# Patient Record
Sex: FEMALE | Employment: UNEMPLOYED | URBAN - METROPOLITAN AREA
[De-identification: names, ages, dates, MRNs, and addresses within clinical notes are randomized per-mention and may not be internally consistent; named-entity substitution may affect disease eponyms.]

---
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
## 2020-01-18 ENCOUNTER — HOSPITAL ENCOUNTER (EMERGENCY)
Age: 20
Discharge: HOME | End: 2020-01-18
Attending: EMERGENCY MEDICINE
Payer: COMMERCIAL

## 2020-01-18 ENCOUNTER — APPOINTMENT (OUTPATIENT)
Dept: GENERAL RADIOLOGY | Age: 20
End: 2020-01-18
Payer: COMMERCIAL

## 2020-01-18 ENCOUNTER — APPOINTMENT (OUTPATIENT)
Dept: CT IMAGING | Age: 20
End: 2020-01-18
Payer: COMMERCIAL

## 2020-01-18 VITALS
DIASTOLIC BLOOD PRESSURE: 78 MMHG | TEMPERATURE: 98.9 F | OXYGEN SATURATION: 99 % | HEART RATE: 99 BPM | RESPIRATION RATE: 16 BRPM | SYSTOLIC BLOOD PRESSURE: 110 MMHG

## 2020-01-18 PROBLEM — S80.11XA: Status: ACTIVE | Noted: 2020-01-18
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
## 2020-01-18 PROBLEM — S30.1XXA ABDOMINAL CONTUSION: Status: ACTIVE | Noted: 2020-01-18

## 2020-01-18 LAB
ABO/RH: NORMAL
ACETAMINOPHEN LEVEL: <5 MCG/ML (ref 10–30)
ALBUMIN SERPL-MCNC: 4.5 G/DL (ref 3.5–5.2)
ALP BLD-CCNC: 62 U/L (ref 35–104)
ALT SERPL-CCNC: 12 U/L (ref 0–32)
ANION GAP SERPL CALCULATED.3IONS-SCNC: 12 MMOL/L (ref 7–16)
ANTIBODY SCREEN: NORMAL
APTT: 32.9 SEC (ref 24.5–35.1)
AST SERPL-CCNC: 20 U/L (ref 0–31)
BILIRUB SERPL-MCNC: 0.3 MG/DL (ref 0–1.2)
BUN BLDV-MCNC: 5 MG/DL (ref 6–20)
CALCIUM SERPL-MCNC: 9.9 MG/DL (ref 8.6–10.2)
CHLORIDE BLD-SCNC: 103 MMOL/L (ref 98–107)
CO2: 22 MMOL/L (ref 22–29)
CREAT SERPL-MCNC: 0.6 MG/DL (ref 0.5–1)
ETHANOL: <10 MG/DL (ref 0–0.08)
GFR AFRICAN AMERICAN: >60
GFR NON-AFRICAN AMERICAN: >60 ML/MIN/1.73
GLUCOSE BLD-MCNC: 109 MG/DL (ref 74–99)
HCG QUALITATIVE: NEGATIVE
HCT VFR BLD CALC: 39.1 % (ref 34–48)
HEMOGLOBIN: 12.8 G/DL (ref 11.5–15.5)
INR BLD: 1.1
LACTIC ACID: 1.3 MMOL/L (ref 0.5–2.2)
MCH RBC QN AUTO: 29 PG (ref 26–35)
MCHC RBC AUTO-ENTMCNC: 32.7 % (ref 32–34.5)
MCV RBC AUTO: 88.7 FL (ref 80–99.9)
PDW BLD-RTO: 12.8 FL (ref 11.5–15)
PLATELET # BLD: 300 E9/L (ref 130–450)
PMV BLD AUTO: 10.5 FL (ref 7–12)
POTASSIUM SERPL-SCNC: 3.5 MMOL/L (ref 3.5–5)
PROTHROMBIN TIME: 12.3 SEC (ref 9.3–12.4)
RBC # BLD: 4.41 E12/L (ref 3.5–5.5)
SALICYLATE, SERUM: <0.3 MG/DL (ref 0–30)
SODIUM BLD-SCNC: 137 MMOL/L (ref 132–146)
TOTAL PROTEIN: 8.3 G/DL (ref 6.4–8.3)
TRICYCLIC ANTIDEPRESSANTS SCREEN SERUM: NEGATIVE NG/ML
WBC # BLD: 8 E9/L (ref 4.5–11.5)

## 2020-01-18 PROCEDURE — 72170 X-RAY EXAM OF PELVIS: CPT

## 2020-01-18 PROCEDURE — 73590 X-RAY EXAM OF LOWER LEG: CPT

## 2020-01-18 PROCEDURE — 83605 ASSAY OF LACTIC ACID: CPT

## 2020-01-18 PROCEDURE — 6360000004 HC RX CONTRAST MEDICATION: Performed by: RADIOLOGY

## 2020-01-18 PROCEDURE — 86901 BLOOD TYPING SEROLOGIC RH(D): CPT

## 2020-01-18 PROCEDURE — 99285 EMERGENCY DEPT VISIT HI MDM: CPT

## 2020-01-18 PROCEDURE — 85730 THROMBOPLASTIN TIME PARTIAL: CPT

## 2020-01-18 PROCEDURE — 85610 PROTHROMBIN TIME: CPT

## 2020-01-18 PROCEDURE — 72125 CT NECK SPINE W/O DYE: CPT

## 2020-01-18 PROCEDURE — 86850 RBC ANTIBODY SCREEN: CPT

## 2020-01-18 PROCEDURE — 86900 BLOOD TYPING SEROLOGIC ABO: CPT

## 2020-01-18 PROCEDURE — 80053 COMPREHEN METABOLIC PANEL: CPT

## 2020-01-18 PROCEDURE — 99285 EMERGENCY DEPT VISIT HI MDM: CPT | Performed by: SURGERY

## 2020-01-18 PROCEDURE — 84703 CHORIONIC GONADOTROPIN ASSAY: CPT

## 2020-01-18 PROCEDURE — 73562 X-RAY EXAM OF KNEE 3: CPT

## 2020-01-18 PROCEDURE — 74177 CT ABD & PELVIS W/CONTRAST: CPT

## 2020-01-18 PROCEDURE — 80307 DRUG TEST PRSMV CHEM ANLYZR: CPT

## 2020-01-18 PROCEDURE — 6810039000 HC L1 TRAUMA ALERT

## 2020-01-18 PROCEDURE — 70450 CT HEAD/BRAIN W/O DYE: CPT

## 2020-01-18 PROCEDURE — 85027 COMPLETE CBC AUTOMATED: CPT

## 2020-01-18 PROCEDURE — 36415 COLL VENOUS BLD VENIPUNCTURE: CPT

## 2020-01-18 PROCEDURE — G0480 DRUG TEST DEF 1-7 CLASSES: HCPCS

## 2020-01-18 PROCEDURE — 71045 X-RAY EXAM CHEST 1 VIEW: CPT

## 2020-01-18 PROCEDURE — 71260 CT THORAX DX C+: CPT

## 2020-01-18 RX ORDER — SODIUM CHLORIDE 0.9 % (FLUSH) 0.9 %
10 SYRINGE (ML) INJECTION PRN
Status: DISCONTINUED | OUTPATIENT
Start: 2020-01-18 | End: 2020-01-18 | Stop reason: HOSPADM

## 2020-01-18 RX ORDER — FENTANYL CITRATE 50 UG/ML
INJECTION, SOLUTION INTRAMUSCULAR; INTRAVENOUS
Status: DISCONTINUED
Start: 2020-01-18 | End: 2020-01-18 | Stop reason: WASHOUT

## 2020-01-18 RX ADMIN — IOPAMIDOL 110 ML: 755 INJECTION, SOLUTION INTRAVENOUS at 12:38
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
## 2020-01-18 NOTE — ED NOTES
Abrasion to right medial shin. Right lower quadrant abdominal pain. Periumbilical pain noted.      Vazquez Zhang RN  01/18/20 [id number]

## 2023-07-31 ENCOUNTER — HOSPITAL ENCOUNTER (EMERGENCY)
Age: 23
Discharge: ANOTHER HOSPITAL | End: 2023-08-01
Attending: EMERGENCY MEDICINE
Payer: COMMERCIAL

## 2023-07-31 DIAGNOSIS — I10 ESSENTIAL HYPERTENSION: ICD-10-CM

## 2023-07-31 DIAGNOSIS — Z34.93 PREGNANT AND NOT YET DELIVERED IN THIRD TRIMESTER: ICD-10-CM

## 2023-07-31 DIAGNOSIS — R47.9 DIFFICULTY WITH SPEECH: Primary | ICD-10-CM

## 2023-07-31 DIAGNOSIS — R20.2 PARESTHESIAS: ICD-10-CM

## 2023-07-31 LAB
ALBUMIN SERPL-MCNC: 3.6 G/DL (ref 3.4–5)
ALBUMIN/GLOB SERPL: 1.2 {RATIO} (ref 1.1–2.2)
ALP SERPL-CCNC: 1060 U/L (ref 40–129)
ALT SERPL-CCNC: 10 U/L (ref 10–40)
ANION GAP SERPL CALCULATED.3IONS-SCNC: 11 MMOL/L (ref 3–16)
AST SERPL-CCNC: 15 U/L (ref 15–37)
BACTERIA URNS QL MICRO: ABNORMAL /HPF
BASOPHILS # BLD: 0.1 K/UL (ref 0–0.2)
BASOPHILS NFR BLD: 0.8 %
BILIRUB SERPL-MCNC: <0.2 MG/DL (ref 0–1)
BILIRUB UR QL STRIP.AUTO: NEGATIVE
BUN SERPL-MCNC: 5 MG/DL (ref 7–20)
CALCIUM SERPL-MCNC: 9.7 MG/DL (ref 8.3–10.6)
CHLORIDE SERPL-SCNC: 106 MMOL/L (ref 99–110)
CLARITY UR: CLEAR
CO2 SERPL-SCNC: 21 MMOL/L (ref 21–32)
COLOR UR: YELLOW
CREAT SERPL-MCNC: <0.5 MG/DL (ref 0.6–1.1)
DEPRECATED RDW RBC AUTO: 14.4 % (ref 12.4–15.4)
EOSINOPHIL # BLD: 0.1 K/UL (ref 0–0.6)
EOSINOPHIL NFR BLD: 0.9 %
EPI CELLS #/AREA URNS AUTO: 7 /HPF (ref 0–5)
GFR SERPLBLD CREATININE-BSD FMLA CKD-EPI: >60 ML/MIN/{1.73_M2}
GLUCOSE BLD-MCNC: 100 MG/DL (ref 70–99)
GLUCOSE SERPL-MCNC: 101 MG/DL (ref 70–99)
GLUCOSE UR STRIP.AUTO-MCNC: NEGATIVE MG/DL
HCT VFR BLD AUTO: 32.2 % (ref 36–48)
HGB BLD-MCNC: 10.6 G/DL (ref 12–16)
HGB UR QL STRIP.AUTO: NEGATIVE
HYALINE CASTS #/AREA URNS AUTO: 0 /LPF (ref 0–8)
INR PPP: 1.02 (ref 0.84–1.16)
KETONES UR STRIP.AUTO-MCNC: NEGATIVE MG/DL
LEUKOCYTE ESTERASE UR QL STRIP.AUTO: ABNORMAL
LYMPHOCYTES # BLD: 2.1 K/UL (ref 1–5.1)
LYMPHOCYTES NFR BLD: 24.7 %
MCH RBC QN AUTO: 28.1 PG (ref 26–34)
MCHC RBC AUTO-ENTMCNC: 33 G/DL (ref 31–36)
MCV RBC AUTO: 85.1 FL (ref 80–100)
MONOCYTES # BLD: 0.7 K/UL (ref 0–1.3)
MONOCYTES NFR BLD: 8.3 %
NEUTROPHILS # BLD: 5.6 K/UL (ref 1.7–7.7)
NEUTROPHILS NFR BLD: 65.3 %
NITRITE UR QL STRIP.AUTO: NEGATIVE
PERFORMED ON: ABNORMAL
PH UR STRIP.AUTO: 6.5 [PH] (ref 5–8)
PLATELET # BLD AUTO: 293 K/UL (ref 135–450)
PMV BLD AUTO: 8.2 FL (ref 5–10.5)
POTASSIUM SERPL-SCNC: 3.8 MMOL/L (ref 3.5–5.1)
PROT SERPL-MCNC: 6.7 G/DL (ref 6.4–8.2)
PROT UR STRIP.AUTO-MCNC: NEGATIVE MG/DL
PROTHROMBIN TIME: 13.4 SEC (ref 11.5–14.8)
RBC # BLD AUTO: 3.78 M/UL (ref 4–5.2)
RBC CLUMPS #/AREA URNS AUTO: 2 /HPF (ref 0–4)
SODIUM SERPL-SCNC: 138 MMOL/L (ref 136–145)
SP GR UR STRIP.AUTO: <=1.005 (ref 1–1.03)
UA COMPLETE W REFLEX CULTURE PNL UR: YES
UA DIPSTICK W REFLEX MICRO PNL UR: YES
URATE SERPL-MCNC: 3 MG/DL (ref 2.6–6)
URN SPEC COLLECT METH UR: ABNORMAL
UROBILINOGEN UR STRIP-ACNC: 0.2 E.U./DL
WBC # BLD AUTO: 8.5 K/UL (ref 4–11)
WBC #/AREA URNS AUTO: 10 /HPF (ref 0–5)

## 2023-07-31 PROCEDURE — 36415 COLL VENOUS BLD VENIPUNCTURE: CPT

## 2023-07-31 PROCEDURE — 2580000003 HC RX 258: Performed by: EMERGENCY MEDICINE

## 2023-07-31 PROCEDURE — 80053 COMPREHEN METABOLIC PANEL: CPT

## 2023-07-31 PROCEDURE — 93005 ELECTROCARDIOGRAM TRACING: CPT | Performed by: EMERGENCY MEDICINE

## 2023-07-31 PROCEDURE — 96360 HYDRATION IV INFUSION INIT: CPT

## 2023-07-31 PROCEDURE — 85610 PROTHROMBIN TIME: CPT

## 2023-07-31 PROCEDURE — 99285 EMERGENCY DEPT VISIT HI MDM: CPT

## 2023-07-31 PROCEDURE — 87086 URINE CULTURE/COLONY COUNT: CPT

## 2023-07-31 PROCEDURE — 84550 ASSAY OF BLOOD/URIC ACID: CPT

## 2023-07-31 PROCEDURE — 81001 URINALYSIS AUTO W/SCOPE: CPT

## 2023-07-31 PROCEDURE — 85025 COMPLETE CBC W/AUTO DIFF WBC: CPT

## 2023-07-31 RX ORDER — 0.9 % SODIUM CHLORIDE 0.9 %
1000 INTRAVENOUS SOLUTION INTRAVENOUS ONCE
Status: COMPLETED | OUTPATIENT
Start: 2023-07-31 | End: 2023-08-01

## 2023-07-31 RX ORDER — VENLAFAXINE 37.5 MG/1
37.5 TABLET ORAL ONCE
COMMUNITY

## 2023-07-31 RX ADMIN — SODIUM CHLORIDE 1000 ML: 9 INJECTION, SOLUTION INTRAVENOUS at 23:38

## 2023-08-01 VITALS
WEIGHT: 211 LBS | RESPIRATION RATE: 22 BRPM | SYSTOLIC BLOOD PRESSURE: 124 MMHG | HEART RATE: 86 BPM | TEMPERATURE: 98.2 F | OXYGEN SATURATION: 97 % | DIASTOLIC BLOOD PRESSURE: 90 MMHG | BODY MASS INDEX: 35.16 KG/M2 | HEIGHT: 65 IN

## 2023-08-01 LAB
EKG ATRIAL RATE: 96 BPM
EKG DIAGNOSIS: NORMAL
EKG P AXIS: 61 DEGREES
EKG P-R INTERVAL: 118 MS
EKG Q-T INTERVAL: 334 MS
EKG QRS DURATION: 84 MS
EKG QTC CALCULATION (BAZETT): 421 MS
EKG R AXIS: 83 DEGREES
EKG T AXIS: 29 DEGREES
EKG VENTRICULAR RATE: 96 BPM

## 2023-08-01 PROCEDURE — 93010 ELECTROCARDIOGRAM REPORT: CPT | Performed by: INTERNAL MEDICINE

## 2023-08-01 ASSESSMENT — PAIN - FUNCTIONAL ASSESSMENT: PAIN_FUNCTIONAL_ASSESSMENT: NONE - DENIES PAIN

## 2023-08-01 NOTE — ED PROVIDER NOTES
[facility]) Emergency [address]    Dena Lawson MD, am the primary clinician of record. CHIEF COMPLAINT  Chief Complaint   Patient presents with    Aphasia     Pt states around 8 pm she started having trouble speaking, states she had no speech and couldn't get out the right words. Pt states she had lip numbness and hand numbness with dizziness. Pt 3rd pregnancy, delivering 11 days. Episode around 20 minutes        HISTORY OF PRESENT ILLNESS  Telma Francis is a 21 y.o. female  who presents to the ED complaining of onset when at dinner around 7:45 PM to 8 PM with her significant other of difficulty with speech. She has a history of anxiety and bipolar disorder and also is roughly 38 weeks pregnant. She denies any abdominal pain, contractions, loss of fluid or vaginal bleeding. She says that this is been an uncomplicated pregnancy. She is now -0-1-2 with a history of elective  in the past.  Her previous deliveries were vaginal.  When she had the difficulty with speech, it was more of a word finding difficulty and difficulty communicating at all but not garbled at all. She also reports some tingling and numbness around her hands bilaterally as well as her lips circumorally. She never had extremity weakness in the arms or legs. She denies any lower extremity numbness or tingling. She did not pass out but did get dizzy at 1 point as well. The symptoms lasted anywhere from 20 minutes to about an hour. They essentially resolved upon arrival to the emergency department and when I evaluate her she is asymptomatic. She never had a headache at any point. No other complaints, modifying factors or associated symptoms. I have reviewed the following from the nursing documentation.     Past Medical History:   Diagnosis Date    Abdominal contusion 2020    Anxiety     Bipolar depression ([address])     takes effexor    Contusion of multiple sites of right lower

## 2023-08-01 NOTE — FLOWSHEET NOTE
This RN and Ob Dr Grant Shell at bedside to assess patient. Patient admits to positive fetal movement, denies bleeding, and denies leaking. She denies any abdominal pain at this time. Will put on fetal monitor.

## 2023-08-01 NOTE — FLOWSHEET NOTE
Dr Roberto Mercer at bedside discussing plan of care. Doctor talked to patient's OB Dr Rosio Hazel from [facility] about transferring patient to their care. Patient agreeable. Doctor Roberto Mercer reviewed fetal tracing and patient may come off fetal monitoring at this time. Patient has positive fetal movement, denies contractions, denies bleeding, and denies leaking.

## 2023-08-02 LAB — BACTERIA UR CULT: NORMAL
